# Patient Record
Sex: FEMALE | Race: WHITE | Employment: STUDENT | URBAN - METROPOLITAN AREA
[De-identification: names, ages, dates, MRNs, and addresses within clinical notes are randomized per-mention and may not be internally consistent; named-entity substitution may affect disease eponyms.]

---
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
## 2021-12-23 ENCOUNTER — OFFICE VISIT (OUTPATIENT)
Dept: INTERNAL MEDICINE CLINIC | Facility: CLINIC | Age: 21
End: 2021-12-23
Payer: COMMERCIAL

## 2021-12-23 VITALS
WEIGHT: 133 LBS | HEART RATE: 96 BPM | DIASTOLIC BLOOD PRESSURE: 68 MMHG | SYSTOLIC BLOOD PRESSURE: 114 MMHG | BODY MASS INDEX: 22.99 KG/M2 | OXYGEN SATURATION: 99 % | TEMPERATURE: 99 F | HEIGHT: 63.75 IN

## 2021-12-23 DIAGNOSIS — H61.032 CHONDRITIS OF AURICLE, LEFT: ICD-10-CM

## 2021-12-23 DIAGNOSIS — L08.9 INFECTED PUNCTURE WOUND: ICD-10-CM

## 2021-12-23 DIAGNOSIS — F32.A ANXIETY AND DEPRESSION: ICD-10-CM

## 2021-12-23 DIAGNOSIS — T14.8XXA INFECTED PUNCTURE WOUND: ICD-10-CM

## 2021-12-23 DIAGNOSIS — Z13.0 SCREENING FOR DEFICIENCY ANEMIA: ICD-10-CM

## 2021-12-23 DIAGNOSIS — Z11.52 ENCOUNTER FOR SCREENING FOR COVID-19: ICD-10-CM

## 2021-12-23 DIAGNOSIS — Z13.29 SCREENING FOR THYROID DISORDER: ICD-10-CM

## 2021-12-23 DIAGNOSIS — F41.9 ANXIETY AND DEPRESSION: ICD-10-CM

## 2021-12-23 DIAGNOSIS — Z00.00 ENCOUNTER FOR PHYSICAL EXAMINATION: Primary | ICD-10-CM

## 2021-12-23 DIAGNOSIS — J02.9 SORE THROAT: ICD-10-CM

## 2021-12-23 PROCEDURE — 3074F SYST BP LT 130 MM HG: CPT | Performed by: INTERNAL MEDICINE

## 2021-12-23 PROCEDURE — 3008F BODY MASS INDEX DOCD: CPT | Performed by: INTERNAL MEDICINE

## 2021-12-23 PROCEDURE — 3078F DIAST BP <80 MM HG: CPT | Performed by: INTERNAL MEDICINE

## 2021-12-23 PROCEDURE — 99395 PREV VISIT EST AGE 18-39: CPT | Performed by: INTERNAL MEDICINE

## 2021-12-23 RX ORDER — LEVOFLOXACIN 750 MG/1
750 TABLET ORAL DAILY
Qty: 7 TABLET | Refills: 0 | Status: SHIPPED | OUTPATIENT
Start: 2021-12-23 | End: 2021-12-30

## 2021-12-23 RX ORDER — BUPROPION HYDROCHLORIDE 150 MG/1
150 TABLET ORAL DAILY
Qty: 90 TABLET | Refills: 1 | Status: SHIPPED | OUTPATIENT
Start: 2021-12-23

## 2021-12-23 RX ORDER — ALPRAZOLAM 0.25 MG/1
0.25 TABLET ORAL 2 TIMES DAILY PRN
Qty: 60 TABLET | Refills: 0 | Status: SHIPPED | OUTPATIENT
Start: 2021-12-23

## 2021-12-23 NOTE — PATIENT INSTRUCTIONS
You are seen in clinic for evaluation of a body piercing infection. As discussed, bacteria in the skin can cause local infection requiring antibiotics.     –I do recommend obtaining blood cultures, checking a blood count  –We will cover with levofloxacin 7

## 2021-12-23 NOTE — H&P
Chief Complaint:   Patient presents with:  Physical: left ear piercing got over the summer, throat feels tight, would like to discuss blood work to check thyroid levels, anxiety    HPI:     Ms. Ross is a 22year old female with no significant past medic Father    • Thyroid disease Father      Allergies:  No Known Allergies  Current Meds:  Current Outpatient Medications   Medication Sig Dispense Refill   • levoFLOXacin 750 MG Oral Tab Take 1 tablet (750 mg total) by mouth daily for 7 days.  7 tablet 0   • b (1.619 m), weight 133 lb (60.3 kg), last menstrual period 11/23/2021, SpO2 99 %. Constitutional: No acute distress. Alert and oriented x 3.   Eyes: EOMI, PERRLA, clear sclera b/l  ENT: Left upper auricle piercing, erythema but no visible drainage; oroph therapist at [facility]: Wellbutrin 150 mg XL once a day, she will notify us with a condition update via MyChart/phone call in 3 to 4 weeks this evening to go up on the medication  –Alprazolam 0.25 mg on an as-needed basis.   We discussed jim 05/02/2018   • Pneumococcal (Prevnar 7) 08/30/2001, 11/01/2001, 01/11/2002, 07/12/2002   • TDAP 07/06/2012   • Varicella 07/12/2002, 07/06/2012        Orders This Visit:  Orders Placed This Encounter      CBC W Differential W Platelet [E]      Comp Metabol

## 2021-12-26 ENCOUNTER — TELEPHONE (OUTPATIENT)
Dept: INTERNAL MEDICINE CLINIC | Facility: CLINIC | Age: 21
End: 2021-12-26

## 2021-12-26 NOTE — TELEPHONE ENCOUNTER
Called patient regarding her Covid positive test that resulted 12/25, obtained 12/23. Condition update:    Recommend that the patient continue to check temperature and if possible pulse ox at home.   Monitor for any worsening of symptoms such as worsenin

## 2021-12-30 ENCOUNTER — LAB ENCOUNTER (OUTPATIENT)
Dept: LAB | Age: 21
End: 2021-12-30
Attending: INTERNAL MEDICINE
Payer: COMMERCIAL

## 2021-12-30 DIAGNOSIS — Z13.29 SCREENING FOR THYROID DISORDER: ICD-10-CM

## 2021-12-30 DIAGNOSIS — F32.A ANXIETY AND DEPRESSION: ICD-10-CM

## 2021-12-30 DIAGNOSIS — F41.9 ANXIETY AND DEPRESSION: ICD-10-CM

## 2021-12-30 DIAGNOSIS — L08.9 INFECTED PUNCTURE WOUND: ICD-10-CM

## 2021-12-30 DIAGNOSIS — Z13.0 SCREENING FOR DEFICIENCY ANEMIA: ICD-10-CM

## 2021-12-30 DIAGNOSIS — T14.8XXA INFECTED PUNCTURE WOUND: ICD-10-CM

## 2021-12-30 DIAGNOSIS — Z00.00 ENCOUNTER FOR PHYSICAL EXAMINATION: ICD-10-CM

## 2021-12-30 DIAGNOSIS — H61.032 CHONDRITIS OF AURICLE, LEFT: ICD-10-CM

## 2021-12-30 LAB
ALBUMIN SERPL-MCNC: 4.3 G/DL (ref 3.4–5)
ALBUMIN/GLOB SERPL: 1.2 {RATIO} (ref 1–2)
ALP LIVER SERPL-CCNC: 55 U/L
ALT SERPL-CCNC: 17 U/L
ANION GAP SERPL CALC-SCNC: 5 MMOL/L (ref 0–18)
AST SERPL-CCNC: 10 U/L (ref 15–37)
BASOPHILS # BLD AUTO: 0.02 X10(3) UL (ref 0–0.2)
BASOPHILS NFR BLD AUTO: 0.4 %
BILIRUB SERPL-MCNC: 0.6 MG/DL (ref 0.1–2)
BUN BLD-MCNC: 13 MG/DL (ref 7–18)
BUN/CREAT SERPL: 15.7 (ref 10–20)
CALCIUM BLD-MCNC: 9.4 MG/DL (ref 8.5–10.1)
CHLORIDE SERPL-SCNC: 107 MMOL/L (ref 98–112)
CO2 SERPL-SCNC: 26 MMOL/L (ref 21–32)
CREAT BLD-MCNC: 0.83 MG/DL
DEPRECATED RDW RBC AUTO: 36.7 FL (ref 35.1–46.3)
EOSINOPHIL # BLD AUTO: 0.12 X10(3) UL (ref 0–0.7)
EOSINOPHIL NFR BLD AUTO: 2.4 %
ERYTHROCYTE [DISTWIDTH] IN BLOOD BY AUTOMATED COUNT: 11.2 % (ref 11–15)
FASTING STATUS PATIENT QL REPORTED: NO
GLOBULIN PLAS-MCNC: 3.7 G/DL (ref 2.8–4.4)
GLUCOSE BLD-MCNC: 81 MG/DL (ref 70–99)
HCT VFR BLD AUTO: 41.6 %
HGB BLD-MCNC: 14.3 G/DL
IMM GRANULOCYTES # BLD AUTO: 0 X10(3) UL (ref 0–1)
IMM GRANULOCYTES NFR BLD: 0 %
LYMPHOCYTES # BLD AUTO: 2.45 X10(3) UL (ref 1–4)
LYMPHOCYTES NFR BLD AUTO: 49.2 %
MCH RBC QN AUTO: 30.8 PG (ref 26–34)
MCHC RBC AUTO-ENTMCNC: 34.4 G/DL (ref 31–37)
MCV RBC AUTO: 89.7 FL
MONOCYTES # BLD AUTO: 0.46 X10(3) UL (ref 0.1–1)
MONOCYTES NFR BLD AUTO: 9.2 %
NEUTROPHILS # BLD AUTO: 1.93 X10 (3) UL (ref 1.5–7.7)
NEUTROPHILS # BLD AUTO: 1.93 X10(3) UL (ref 1.5–7.7)
NEUTROPHILS NFR BLD AUTO: 38.8 %
OSMOLALITY SERPL CALC.SUM OF ELEC: 285 MOSM/KG (ref 275–295)
PLATELET # BLD AUTO: 326 10(3)UL (ref 150–450)
POTASSIUM SERPL-SCNC: 3.5 MMOL/L (ref 3.5–5.1)
PROT SERPL-MCNC: 8 G/DL (ref 6.4–8.2)
RBC # BLD AUTO: 4.64 X10(6)UL
SODIUM SERPL-SCNC: 138 MMOL/L (ref 136–145)
TSI SER-ACNC: 1.62 MIU/ML (ref 0.36–3.74)
VIT D+METAB SERPL-MCNC: 16.1 NG/ML (ref 30–100)
WBC # BLD AUTO: 5 X10(3) UL (ref 4–11)

## 2021-12-30 PROCEDURE — 87040 BLOOD CULTURE FOR BACTERIA: CPT

## 2021-12-30 PROCEDURE — 80053 COMPREHEN METABOLIC PANEL: CPT

## 2021-12-30 PROCEDURE — 85025 COMPLETE CBC W/AUTO DIFF WBC: CPT

## 2021-12-30 PROCEDURE — 36415 COLL VENOUS BLD VENIPUNCTURE: CPT

## 2021-12-30 PROCEDURE — 84443 ASSAY THYROID STIM HORMONE: CPT

## 2021-12-30 PROCEDURE — 82306 VITAMIN D 25 HYDROXY: CPT

## 2022-01-05 ENCOUNTER — TELEPHONE (OUTPATIENT)
Dept: INTERNAL MEDICINE CLINIC | Facility: CLINIC | Age: 22
End: 2022-01-05

## 2022-01-05 NOTE — TELEPHONE ENCOUNTER
I reviewed the work-up from 12/30/2021    CMP: Unremarkable  Vitamin D 16.1  TSH and CBC within normal limits  Blood cultures negative x2    Recommendations:  -Vitamin D 50,000 units once a week for 8 weeks    I left a voicemail to obtain a condition updat

## 2022-01-17 RX ORDER — ERGOCALCIFEROL 1.25 MG/1
50000 CAPSULE ORAL WEEKLY
Qty: 8 CAPSULE | Refills: 0 | Status: SHIPPED | OUTPATIENT
Start: 2022-01-17

## 2022-01-17 NOTE — TELEPHONE ENCOUNTER
As FYI to DR. HALEY - called patient and relayed DR. HALEY message - verbalized understanding . HOme # is correct. RX sent per DR. HALEY. patient states covid past - had only mild SX.  Wellbutrin is helping, she can focus better ,things are more managable

## 2022-02-11 RX ORDER — ERGOCALCIFEROL 1.25 MG/1
CAPSULE ORAL
Qty: 12 CAPSULE | Refills: 1 | OUTPATIENT
Start: 2022-02-11

## 2022-08-04 ENCOUNTER — TELEPHONE (OUTPATIENT)
Dept: INTERNAL MEDICINE CLINIC | Facility: CLINIC | Age: 22
End: 2022-08-04

## 2022-08-04 RX ORDER — ALPRAZOLAM 0.25 MG/1
0.25 TABLET ORAL 2 TIMES DAILY PRN
Qty: 60 TABLET | Refills: 0 | Status: SHIPPED | OUTPATIENT
Start: 2022-08-04

## 2022-08-04 NOTE — TELEPHONE ENCOUNTER
To MD:  The above refill request is for a controlled substance. Please review pended medication order. Print and sign for staff to fax to pharmacy or prescribe electronically.     Last office visit: 12/23/21  Last time refill sent and quantity/refills: 12/23/21 qty 60 plus 0

## 2023-01-03 ENCOUNTER — OFFICE VISIT (OUTPATIENT)
Dept: INTERNAL MEDICINE CLINIC | Facility: CLINIC | Age: 23
End: 2023-01-03
Payer: COMMERCIAL

## 2023-01-03 VITALS
DIASTOLIC BLOOD PRESSURE: 76 MMHG | HEART RATE: 83 BPM | TEMPERATURE: 98 F | WEIGHT: 129 LBS | RESPIRATION RATE: 18 BRPM | SYSTOLIC BLOOD PRESSURE: 116 MMHG | BODY MASS INDEX: 22.29 KG/M2 | HEIGHT: 63.75 IN | OXYGEN SATURATION: 99 %

## 2023-01-03 DIAGNOSIS — F41.9 ANXIETY AND DEPRESSION: ICD-10-CM

## 2023-01-03 DIAGNOSIS — K21.9 GASTROESOPHAGEAL REFLUX DISEASE, UNSPECIFIED WHETHER ESOPHAGITIS PRESENT: ICD-10-CM

## 2023-01-03 DIAGNOSIS — F32.A ANXIETY AND DEPRESSION: ICD-10-CM

## 2023-01-03 DIAGNOSIS — R13.12 OROPHARYNGEAL DYSPHAGIA: Primary | ICD-10-CM

## 2023-01-03 DIAGNOSIS — E04.9 GOITER: ICD-10-CM

## 2023-01-03 PROCEDURE — 99214 OFFICE O/P EST MOD 30 MIN: CPT | Performed by: INTERNAL MEDICINE

## 2023-01-03 PROCEDURE — 3078F DIAST BP <80 MM HG: CPT | Performed by: INTERNAL MEDICINE

## 2023-01-03 PROCEDURE — 3074F SYST BP LT 130 MM HG: CPT | Performed by: INTERNAL MEDICINE

## 2023-01-03 PROCEDURE — 3008F BODY MASS INDEX DOCD: CPT | Performed by: INTERNAL MEDICINE

## 2023-01-03 RX ORDER — PANTOPRAZOLE SODIUM 40 MG/1
40 TABLET, DELAYED RELEASE ORAL
Qty: 30 TABLET | Refills: 0 | Status: SHIPPED | OUTPATIENT
Start: 2023-01-03

## 2023-01-04 ENCOUNTER — HOSPITAL ENCOUNTER (OUTPATIENT)
Dept: ULTRASOUND IMAGING | Age: 23
Discharge: HOME | End: 2023-01-04
Attending: INTERNAL MEDICINE
Payer: COMMERCIAL

## 2023-01-04 DIAGNOSIS — E04.9 GOITER: ICD-10-CM

## 2023-01-04 PROCEDURE — 76536 US EXAM OF HEAD AND NECK: CPT | Performed by: INTERNAL MEDICINE

## 2023-01-23 ENCOUNTER — TELEPHONE (OUTPATIENT)
Dept: INTERNAL MEDICINE CLINIC | Facility: CLINIC | Age: 23
End: 2023-01-23

## 2023-01-23 NOTE — TELEPHONE ENCOUNTER
Pt. Called stating she saw Dr. Giovanna Manzanares and was prescribed Zoloft by her. She does not want to be on Zoloft. She is looking for a moer anti-anxiety based medication. She is asking Dr. Delroy Gaming to prescribe her something different. Pt. Stated she is keeping Dr. Delroy Gaming as her primary physician. She saw Dr. Giovanna Manzanares because Reji Apple was not available, and she thought she was in the same practice as Dr. Delroy Gaming. Pt. Can be reached at [phone].

## 2023-01-24 NOTE — TELEPHONE ENCOUNTER
Patient is calling back. Patient is out of state, in [place] at school. Patient cannot schedule an in-person appointment at this time. Patient was also advised that phone and video visits can only be completed if the physician and patient are both in [place]. FYI to nursing/Dr Crump.

## 2023-01-25 NOTE — TELEPHONE ENCOUNTER
Reviewed office visit 1/3/2023-Dr. Tacho Miranda, noted started on Zoloft.     LVM will call again later

## 2023-01-26 ENCOUNTER — TELEPHONE (OUTPATIENT)
Dept: INTERNAL MEDICINE CLINIC | Facility: CLINIC | Age: 23
End: 2023-01-26

## 2023-01-26 RX ORDER — PANTOPRAZOLE SODIUM 40 MG/1
TABLET, DELAYED RELEASE ORAL
Qty: 90 TABLET | Refills: 0 | Status: SHIPPED | OUTPATIENT
Start: 2023-01-26

## 2023-01-26 NOTE — TELEPHONE ENCOUNTER
Patient is returning Dr Brad Powers call she will be in class at 5:20 this evening and she will be available all day tomorrow

## 2023-01-26 NOTE — TELEPHONE ENCOUNTER
We do not want to do 90-day prescription due to my have to change it a few weeks since this is the beginning of treatment

## 2023-01-26 NOTE — TELEPHONE ENCOUNTER
Please review. Protocol passed, but only filled for 30 on 1/3/2023.     Requested Prescriptions   Pending Prescriptions Disp Refills    PANTOPRAZOLE 40 MG Oral Tab EC [Pharmacy Med Name: PANTOPRAZOLE SOD DR 40 MG TAB] 30 tablet 0     Sig: TAKE 1 TABLET BY MOUTH EVERY DAY IN THE MORNING BEFORE BREAKFAST       Gastrointestional Medication Protocol Passed - 1/25/2023  7:31 AM        Passed - In person appointment or virtual visit in the past 12 mos or appointment in next 3 mos     Recent Outpatient Visits              3 weeks ago Oropharyngeal dysphagia    [address] [facility], Erwin Hdez MD    Office Visit    1 year ago Encounter for physical examination    Ne Harrison MD    Office Visit    2 years ago Cellulitis of finger of right hand    Oralia Jenkins MD    Office Visit    3 years ago Family history of thyroid disease    Pediatrics - Amna Cota MD    Office Visit    3 years ago Noise effect on inner ear, bilateral    [address] [facility], An[place] Cancino    Office Visit                           Recent Outpatient Visits              3 weeks ago Oropharyngeal dysphagia    [address] [facility], Erwin Hdze MD    Office Visit    1 year ago Encounter for physical examination    Ne Harrison MD    Office Visit    2 years ago Cellulitis of finger of right hand    Oralia Jenkins MD    Office Visit    3 years ago Family history of thyroid disease    Pediatrics - Amna Cota MD    Office Visit    3 years ago Noise effect on inner ear, bilateral    [address] [place], [address], Anil Cancino    Office Visit

## 2023-01-26 NOTE — TELEPHONE ENCOUNTER
\We will monitor 90-day prescription since this is the beginning of treatment and we might have to change it in few weeks

## 2023-01-27 RX ORDER — ESCITALOPRAM OXALATE 5 MG/1
5 TABLET ORAL DAILY
Qty: 90 TABLET | Refills: 1 | Status: SHIPPED | OUTPATIENT
Start: 2023-01-27

## 2023-01-27 RX ORDER — ALPRAZOLAM 0.25 MG/1
0.25 TABLET ORAL 2 TIMES DAILY PRN
Qty: 30 TABLET | Refills: 1 | Status: SHIPPED | OUTPATIENT
Start: 2023-01-27

## 2023-01-28 NOTE — TELEPHONE ENCOUNTER
She's doing OK. She did have an US of the thyroid 1/4/2023 - has multinodular goiter. Seeing a specialist very soon. Has seen 3 therapists in the last 6 years, dealing with panic attacks. Last week she was having them on a daily basis. Panic attacks triggers when life is very stressed. Fear of death in particular. No SI or HI    Has a lot of coping  Mechanisms, breathing exercises 6-7 breathing. Stretching/walking. The Xanax helps in the past.     Did have side effects from Zoloft - bother of her parents have had negative effects from Roel Gary    When she has a panic attack - heart palpitations, depersonalization, loss of control, fight or flight response even just a response to a thought. Her first episode 5 years. We did discuss potential options such as SSRI therapy, propranolol as her father has had good results with this-more so for his tremors but also with the fact from anxiety. She would like to proceed with Lexapro 5 mg once a day, may take 3-6 weeks to take full effect. She will send us a [email or web address] message on how things are going. Refilled Xanax and sent Lexapro to her pharmacy.     She will send us a [email or web address] message regarding the multinodular thyroid

## 2023-07-23 ENCOUNTER — TELEPHONE (OUTPATIENT)
Dept: INTERNAL MEDICINE CLINIC | Facility: CLINIC | Age: 23
End: 2023-07-23

## 2023-08-16 NOTE — TELEPHONE ENCOUNTER
Patient returned call, left voicemail message  Patient has moved to [place]   She is running low on medication  Tasked to [employer]

## 2023-08-17 RX ORDER — ESCITALOPRAM OXALATE 5 MG/1
5 TABLET ORAL DAILY
Qty: 90 TABLET | Refills: 1 | Status: CANCELLED | OUTPATIENT
Start: 2023-08-17

## 2023-08-17 NOTE — TELEPHONE ENCOUNTER
Needs to establish with PCP in [place]. Mychart sent. Not seen in over 2 years. Current refill request refused due to refill is either a duplicate request or has active refills at the pharmacy. Check previous templates. Requested Prescriptions     Pending Prescriptions Disp Refills    escitalopram 5 MG Oral Tab 90 tablet 1     Sig: Take 1 tablet (5 mg total) by mouth daily.

## 2023-08-25 RX ORDER — ESCITALOPRAM OXALATE 5 MG/1
5 TABLET ORAL DAILY
Qty: 90 TABLET | Refills: 1 | OUTPATIENT
Start: 2023-08-25